# Patient Record
Sex: FEMALE | URBAN - METROPOLITAN AREA
[De-identification: names, ages, dates, MRNs, and addresses within clinical notes are randomized per-mention and may not be internally consistent; named-entity substitution may affect disease eponyms.]

---

## 2022-01-03 ENCOUNTER — HOSPITAL ENCOUNTER (OUTPATIENT)
Dept: LAB | Age: 27
Discharge: HOME | End: 2022-01-03
Payer: COMMERCIAL

## 2022-01-03 PROCEDURE — U0003 INFECTIOUS AGENT DETECTION BY NUCLEIC ACID (DNA OR RNA); SEVERE ACUTE RESPIRATORY SYNDROME CORONAVIRUS 2 (SARS-COV-2) (CORONAVIRUS DISEASE [COVID-19]), AMPLIFIED PROBE TECHNIQUE, MAKING USE OF HIGH THROUGHPUT TECHNOLOGIES AS DESCRIBED BY CMS-2020-01-R: HCPCS

## 2022-01-04 LAB — SARS-COV-2, NAA: DETECTED

## 2022-12-03 RX ORDER — EPINEPHRINE 0.1 MG/ML
1 INJECTION INTRACARDIAC; INTRAVENOUS
Status: CANCELLED | OUTPATIENT
Start: 2022-12-03 | End: 2022-12-04

## 2022-12-03 RX ORDER — ATROPINE SULFATE 0.1 MG/ML
0.5 INJECTION INTRAVENOUS
Status: CANCELLED | OUTPATIENT
Start: 2022-12-03 | End: 2022-12-04

## 2022-12-03 RX ORDER — DIPHENHYDRAMINE HYDROCHLORIDE 50 MG/ML
50 INJECTION, SOLUTION INTRAMUSCULAR; INTRAVENOUS ONCE
Status: CANCELLED | OUTPATIENT
Start: 2022-12-03 | End: 2022-12-03

## 2022-12-03 RX ORDER — DEXTROMETHORPHAN/PSEUDOEPHED 2.5-7.5/.8
1.2 DROPS ORAL
Status: CANCELLED | OUTPATIENT
Start: 2022-12-03

## 2022-12-03 NOTE — H&P
Assessment/Plan  # Detail Type Description    1. Assessment Gastro-esophageal reflux disease w/o esophagitis (K21.9). Impression Pt of Dr. Cesar Hernandez, referred to GI for evaluation and treatment of GERD.  __________________________________________  *Onset: June 2022  *C/O: Heartburn, N/V, Eructation, and epigastric bloating - occurred initially with everything eaten. Has now decreased in frequency to at least once a day, but no longer every time she eats. -Tried taking Omeprazole 40mg PRN (was taking with food), has Sucralfate on med list but does not take this regularly. __________________________________________  BMI: 37.94 (Obese w/Short Frame)  s/p Abdominoplasty (March 1st, 2022). Patient Plan -Start \"Anti-Reflux\" diet  -Recommend taking Omeprazole QAM with proper dosage timing (instructions provided)  __________________________________________  *EGD Plan: Will proceed with EGD with Dr. Nicole Baum, to evaluate upper GI tract for abnormalities, evidence to explain symptoms (of bleeding), and Wayne's epithelium with biopsies, polypectomy, or dilation as indicated. *EGD Risks:  Explained risks of procedure to include bleeding, infection, reaction to sedation, and perforation with possible need for admission to the hospital, and in the most extensive of  circumstances, the patient may require surgery. Pt verbalized understanding of these risks and is agreeable with this procedure. Plan Orders Further diagnostic evaluations ordered today include(s) UPPER GI ENDOSCOPY, DIAGNOSIS to be performed. She will be scheduled for GASTROENTEROLOGY PROCEDURE, Next Lab Date is within 1 Month on 12/06/2022. Clinical information/comments: at location [facility]. The surgeon scheduled is Woody Tapia MD. An assistant has not been requested. 2. Assessment Bloating (R14.0). Impression See Above. 3. Assessment Eructation (R14.2). Impression See Above.          4. Assessment Nausea with vomiting (R11.2). Impression See Above. This 31year old female presents for GERD. History of Present Illness  1. GERD   The onset of the heartburn was 4 months ago. The severity is moderate. Duration: varies. The problem is improving. There is no radiation of pain. The patient reports heartburn. It occurs daily. The symptoms are aggravated by fatty foods and tomatoes base. Denies relieving factors. Associated symptoms include awakens w/ choking or heartburn and reflux. Pertinent negatives include aspiration, chronic cough, dental erosions, dysphagia, dyspnea, globus sensation, halitosis, hoarseness, melena, nausea, pneumonitis, post-nasal drainage, sore throat, stridor, vomiting, weight gain and weight loss. Additional information: BM 1-2x daily          Problem List  Problem Description Onset Date Chronic Clinical Status Notes   Back pain  Y     Asthma  Y     Gastroesophageal reflux in child 11/15/2022 N       Past Medical/Surgical History   (Detailed)  Disease/disorder Onset Date Management Date Comments   Caesarean delivery 2015   CPB 2019 -TOÑO RODGERSEMAREDUARDO     Ankle arthroplasty with debridement, osteochondral lesion microfracture repair, right talus. Brostrom lateral ankle stabilization 2020    Asthma             Family History   (Detailed)    Relationship Family Member Name  Age at Death Condition Onset Age Cause of Death       Family history of Diabetes mellitus  N       Family history of Depression  N       Family history of Stroke  N       Family history of Coronary artery disease  N       Family history of Mental illness  N       Family history of Allergies  N       Family history of Hypertension  N     Social History  (Detailed)  Tobacco use reviewed. Preferred language is [place].       Education/Employment/Occupation  Employment History Status Retired Restrictions   [employer]        Marital Status/Family/Social Support  Marital status: Single     Tobacco use status: Current non-smoker. Smoking status: Never smoker. Tobacco Screening  Patient has never used tobacco. Patient has not used tobacco in the last 30 days. Patient has not used smokeless tobacco in the last 30 days. Smoking Status  Type Smoking Status Usage Per Day Years Used Pack Years Total Pack Years    Never smoker         Alcohol  There is a history of alcohol use. Type: Wine. Caffeine  The patient does not use caffeine. Lifestyle  Moderate activity level. Medications (active prior to today)  Medication Instructions Start Date Stop Date Refilled Elsewhere   multivitamin tablet take 1 tablet by oral route  every day with food //   Y   Tumersaid 100 mg-100 mg-100 mg-125 mg tablet  //   Y   sucralfate 100 mg/mL oral suspension take 10 milliliter by oral route 4 times every day on an empty stomach 1 hour before meals and at bedtime 09/01/2022   N   omeprazole 40 mg capsule,delayed release take 1 capsule by oral route  every day before a meal 09/01/2022 09/01/2022 N       Medication Reconciliation  Medications reconciled today.     Medication Reviewed  Adherence Medication Name Sig Desc Elsewhere Status   taking as directed multivitamin tablet take 1 tablet by oral route  every day with food Y Verified   taking as directed Tumersaid 100 mg-100 mg-100 mg-125 mg tablet  Y Verified   taking as directed omeprazole 40 mg capsule,delayed release take 1 capsule by oral route  every day before a meal N Verified   taking as directed sucralfate 100 mg/mL oral suspension take 10 milliliter by oral route 4 times every day on an empty stomach 1 hour before meals and at bedtime N Verified     Medications (Added, Continued or Stopped today)  Start Date Medication Directions PRN Status PRN Reason Instruction Stop Date    multivitamin tablet take 1 tablet by oral route  every day with food N      09/01/2022 omeprazole 40 mg capsule,delayed release take 1 capsule by oral route  every day before a meal N      09/01/2022 sucralfate 100 mg/mL oral suspension take 10 milliliter by oral route 4 times every day on an empty stomach 1 hour before meals and at bedtime N       Tumersaid 100 mg-100 mg-100 mg-125 mg tablet  N        Allergies  Ingredient Reaction (Severity) Medication Name Comment   NO KNOWN ALLERGIES        Reviewed, no changes. Review of Systems  System Neg/Pos Details   Constitutional Negative Chills, Fever, Malaise, Weight gain and Weight loss. ENMT Negative Dental erosions, Ear infections, Globus sensation, Halitosis, Hoarseness, Nasal congestion, Post-nasal drainage, Sinus Infection and Sore throat. Eyes Negative Double vision and Eye pain. Respiratory Negative Aspiration, Asthma, Chronic cough, Dyspnea, Pleuritic pain, Pneumonitis, Stridor and Wheezing. Cardio Negative Chest pain, Edema and Irregular heartbeat/palpitations. GI Positive Awakenings with choking or heartburn, Reflux. GI Negative Abdominal pain, Change in bowel habits, Constipation, Decreased appetite, Diarrhea, Dysphagia, Heartburn, Hematemesis, Hematochezia, Melena, Nausea and Vomiting.  Negative Dysuria, Hematuria, Urinary frequency, Urinary incontinence and Urinary retention. Endocrine Negative Cold intolerance, Heat intolerance and Increased thirst.   Neuro Negative Dizziness, Headache, Numbness, Tremors and Vertigo. Psych Negative Anxiety, Depression and Increased stress. Integumentary Negative Hives, Pruritus and Rash. MS Negative Back pain, Joint pain and Myalgia. Hema/Lymph Negative Easy bleeding, Easy bruising and Lymphadenopathy. Reproductive Negative Breast lumps, Breast pain and Vaginal discharge.        Vital Signs   Height  Time ft in cm Last Measured Height Position   11:11 AM 5.0 5.00 165.10 09/01/2022 Standing     Weight/BSA/BMI  Time lb oz kg Context BMI kg/m2 BSA m2   11:11 .00  103.419 dressed with shoes 37.94      12/06/22 1107 98.4 °F (36.9 °C) 80 125/73 -- AS     Respiratory Therapy (last day)    Date/Time Resp SpO2 O2 Device O2 Flow Rate (L/min) Who   12/06/22 1107 16 99 % None (Room air) -- AS     Physical  Exam  Exam Findings Details   Female GI Quick Visit Comments Obese w/Short Frame   Constitutional Normal Well developed. Eyes Normal Conjunctiva - Right: Normal, Left: Normal. Sclera - Right: Normal, Left: Normal.   Nasopharynx Normal Lips/teeth/gums - Normal.   Neck Exam Normal Inspection - Normal.   Respiratory Normal Inspection - Normal.   Cardiovascular Normal Regular rate and rhythm. No murmurs, gallops, or rubs. Vascular Normal Pulses - Brachial: Normal.   Skin Normal Inspection - Normal.   Musculoskeletal Normal Hands/Wrist - Right: Normal, Left: Normal.   Extremity Normal No edema. Neurological Normal Fine motor skills - Normal.   Psychiatric Normal Orientation - Oriented to time, place, person & situation. Appropriate mood and affect. Patient Education  # Patient Education   1.  Gastroesophageal Reflux Disease (GERD): Care Instructions       Immunizations Entered by History  Date Immunization   10/13/2021 12:00:00 AM COVID-19, mRNA, LNP-S, PF, 100 mcg or 50 mcg dose       Active Patient Care Team Members  Name Contact Agency Type Support Role Relationship Active Date Inactive Date Specialty   John Purdy   Patient provider PCP   Family Practice   Patient questioned and examined

## 2022-12-06 ENCOUNTER — HOSPITAL ENCOUNTER (OUTPATIENT)
Age: 27
Setting detail: OUTPATIENT SURGERY
Discharge: HOME | End: 2022-12-06
Attending: INTERNAL MEDICINE | Admitting: INTERNAL MEDICINE
Payer: MEDICAID

## 2022-12-06 VITALS
BODY MASS INDEX: 35.46 KG/M2 | DIASTOLIC BLOOD PRESSURE: 75 MMHG | RESPIRATION RATE: 16 BRPM | WEIGHT: 225.9 LBS | SYSTOLIC BLOOD PRESSURE: 116 MMHG | HEART RATE: 82 BPM | OXYGEN SATURATION: 100 % | HEIGHT: 67 IN | TEMPERATURE: 97.9 F

## 2022-12-06 LAB — HCG UR QL: NEGATIVE

## 2022-12-06 PROCEDURE — 88305 TISSUE EXAM BY PATHOLOGIST: CPT

## 2022-12-06 PROCEDURE — 81025 URINE PREGNANCY TEST: CPT

## 2022-12-06 PROCEDURE — 2709999900 HC NON-CHARGEABLE SUPPLY: Performed by: INTERNAL MEDICINE

## 2022-12-06 PROCEDURE — 74011250636 HC RX REV CODE- 250/636: Performed by: INTERNAL MEDICINE

## 2022-12-06 PROCEDURE — 77030040361 HC SLV COMPR DVT MDII -B: Performed by: INTERNAL MEDICINE

## 2022-12-06 PROCEDURE — 76040000007: Performed by: INTERNAL MEDICINE

## 2022-12-06 RX ORDER — SODIUM CHLORIDE 0.9 % (FLUSH) 0.9 %
5-40 SYRINGE (ML) INJECTION EVERY 8 HOURS
Status: DISCONTINUED | OUTPATIENT
Start: 2022-12-06 | End: 2022-12-06 | Stop reason: HOSPADM

## 2022-12-06 RX ORDER — NALOXONE HYDROCHLORIDE 0.4 MG/ML
0.4 INJECTION, SOLUTION INTRAMUSCULAR; INTRAVENOUS; SUBCUTANEOUS
Status: DISCONTINUED | OUTPATIENT
Start: 2022-12-06 | End: 2022-12-06 | Stop reason: HOSPADM

## 2022-12-06 RX ORDER — FENTANYL CITRATE 50 UG/ML
100 INJECTION, SOLUTION INTRAMUSCULAR; INTRAVENOUS
Status: DISCONTINUED | OUTPATIENT
Start: 2022-12-06 | End: 2022-12-06 | Stop reason: HOSPADM

## 2022-12-06 RX ORDER — SODIUM CHLORIDE 9 MG/ML
1000 INJECTION, SOLUTION INTRAVENOUS CONTINUOUS
Status: DISCONTINUED | OUTPATIENT
Start: 2022-12-06 | End: 2022-12-06 | Stop reason: HOSPADM

## 2022-12-06 RX ORDER — MIDAZOLAM HYDROCHLORIDE 1 MG/ML
.25-5 INJECTION, SOLUTION INTRAMUSCULAR; INTRAVENOUS
Status: DISCONTINUED | OUTPATIENT
Start: 2022-12-06 | End: 2022-12-06 | Stop reason: HOSPADM

## 2022-12-06 RX ORDER — SODIUM CHLORIDE 0.9 % (FLUSH) 0.9 %
5-40 SYRINGE (ML) INJECTION AS NEEDED
Status: DISCONTINUED | OUTPATIENT
Start: 2022-12-06 | End: 2022-12-06 | Stop reason: HOSPADM

## 2022-12-06 RX ORDER — FLUMAZENIL 0.1 MG/ML
0.2 INJECTION INTRAVENOUS
Status: DISCONTINUED | OUTPATIENT
Start: 2022-12-06 | End: 2022-12-06 | Stop reason: HOSPADM

## 2022-12-06 RX ORDER — FLUTICASONE PROPIONATE 50 MCG
1 SPRAY, SUSPENSION (ML) NASAL AS NEEDED
COMMUNITY
Start: 2022-07-06

## 2022-12-06 RX ADMIN — SODIUM CHLORIDE 1000 ML: 9 INJECTION, SOLUTION INTRAVENOUS at 11:53

## 2022-12-06 NOTE — PROCEDURES
(EGD) Esophagogastroduodenoscopy (UPPER ENDOSCOPY) Procedure Note  [facility] [place]  __________________________________________________________________________________________________________________________      2022     Patient: Reginald Hastings YOB: 1995 Gender: female Age: 28 y.o. INDICATION:  Pt of Dr. Trinh Moore, referred for new onset  GERD. that started in 2022  *C/O: frequent belching with acid regurgitation on daily basis mostly after eating or drinking. She has rare Nx and occasional /Vx, She has been having frequent Eructation for long time. She c/o epigastric bloating - occurred initially with everything eaten. Has now decreased in frequency to at least once a day, but no longer every time she eats. She felt much better Omeprazole 40mg for one month was taking with food) but stopped 2 weeks ago. It made her feel better. Sh etried also the Sucralfate . She lost 20 lbs since abdominoplasty in 2022. She has one bm every 1 to 2 days. . No smoking but drinks wine every 2 days. BMI: 37.94 (Obese w/Short Frame)  s/p Abdominoplasty (2022). : Aruna Anna MD    Referring Provider:  Bhavesh Wood MD    Sedation:  Versed 10 mg IV, Fentanyl 100 mcg IV  Procedure Details:  After infomed consent was obtained for the procedure, with all risks and benefits of procedure explained to the patient was taken to the endoscopy suite and placed in the left lateral decubitus position. Following sequential administration of sedation as per above, the endoscope was inserted into the mouth and advanced under direct vision to third portion of the duodenum. A careful inspection was made as the gastroscope was withdrawn, including a retroflexed view of the proximal stomach; findings and interventions are described below.       OROPHARYNX: The vocal Cords and the larynx are normal.   ESOPHAGUS: The proximal, mid, and distal oesophagus are normal. The Z-Line is intact. No Hiatal hernia. Diaphragmatic opening or notch is located at 38 cm. STOMACH: Few small antral erosions. No evidence of blood, fluid or solid food retention. The fundus on antegrade and retroflex views is normal. The cardia, body, lesser curvature, greater curvature, and pylorus are normal. The gastric mucosa is normal. 4 gastric biopsies. DUODENUM: The bulb, second, third, portions and major papilla are normal.  PROXIMAL JEJUNUM:  Not examined. .  Therapies:  4 gastric biopsies. Specimen: one           Complications:   None    EBL:  Negligible. IMPLANTS: * No implants in log *  IMPRESSION: Few small antral erosions. The gastric mucosa is normal. 4 gastric biopsies. RECOMMENDATION:  May resume antireflux  diet. Avoid NSAID's and alcohol. Make a FU appointment at the office. Start taking Omeprazole 20  mg only as needed to take half an hour before a meal. Avoid eating for> 3 hours before reclining.       Assistant: None    --Justin Robbins MD on 12/6/2022 at 12:53 PM

## (undated) DEVICE — TRAP SPEC POLYP REM STRNR CLN DSGN MAGNIFYING WIND DISP

## (undated) DEVICE — KENDALL RADIOLUCENT FOAM MONITORING ELECTRODE RECTANGULAR SHAPE: Brand: KENDALL

## (undated) DEVICE — MOUTHPIECE ENDOSCP 20X27MM --

## (undated) DEVICE — GARMENT,MEDLINE,DVT,INT,CALF,MED, GEN2: Brand: MEDLINE

## (undated) DEVICE — SPONGE GZ W4XL4IN COT 12 PLY TYP VII WVN C FLD DSGN

## (undated) DEVICE — TUBING, SUCTION, 1/4" X 12', STRAIGHT: Brand: MEDLINE

## (undated) DEVICE — SINGLE PORT MANIFOLD: Brand: NEPTUNE 2

## (undated) DEVICE — REM POLYHESIVE ADULT PATIENT RETURN ELECTRODE: Brand: VALLEYLAB

## (undated) DEVICE — MAJ-1414 SINGLE USE ADPATER BIOPSY VALV: Brand: SINGLE USE ADAPTOR BIOPSY VALVE

## (undated) DEVICE — Device